# Patient Record
Sex: MALE | Race: WHITE | Employment: STUDENT | URBAN - METROPOLITAN AREA
[De-identification: names, ages, dates, MRNs, and addresses within clinical notes are randomized per-mention and may not be internally consistent; named-entity substitution may affect disease eponyms.]

---

## 2019-04-12 ENCOUNTER — HOSPITAL ENCOUNTER (EMERGENCY)
Age: 29
Discharge: HOME | End: 2019-04-12
Payer: COMMERCIAL

## 2019-04-12 VITALS
RESPIRATION RATE: 18 BRPM | HEIGHT: 70 IN | HEART RATE: 95 BPM | BODY MASS INDEX: 25.77 KG/M2 | WEIGHT: 180 LBS | DIASTOLIC BLOOD PRESSURE: 92 MMHG | OXYGEN SATURATION: 98 % | SYSTOLIC BLOOD PRESSURE: 153 MMHG | TEMPERATURE: 98.8 F

## 2019-04-12 DIAGNOSIS — S61.412A LACERATION OF LEFT PALM, INITIAL ENCOUNTER: Primary | ICD-10-CM

## 2019-04-12 PROCEDURE — 90471 IMMUNIZATION ADMIN: CPT | Performed by: PHYSICIAN ASSISTANT

## 2019-04-12 PROCEDURE — 6360000002 HC RX W HCPCS: Performed by: PHYSICIAN ASSISTANT

## 2019-04-12 PROCEDURE — 99282 EMERGENCY DEPT VISIT SF MDM: CPT

## 2019-04-12 PROCEDURE — 12001 RPR S/N/AX/GEN/TRNK 2.5CM/<: CPT

## 2019-04-12 PROCEDURE — 90715 TDAP VACCINE 7 YRS/> IM: CPT | Performed by: PHYSICIAN ASSISTANT

## 2019-04-12 RX ADMIN — TETANUS TOXOID, REDUCED DIPHTHERIA TOXOID AND ACELLULAR PERTUSSIS VACCINE, ADSORBED 0.5 ML: 5; 2.5; 8; 8; 2.5 SUSPENSION INTRAMUSCULAR at 20:05

## 2019-04-12 ASSESSMENT — PAIN SCALES - GENERAL: PAINLEVEL_OUTOF10: 2

## 2019-04-12 NOTE — ED PROVIDER NOTES
[facility]     Department of Emergency Medicine   ED  Provider Note  Admit Date/RoomTime: 4/12/2019  7:50 PM  ED Room: /   Chief Complaint   Laceration (L hand/thumb, accidently stabbed self with scissors, due for tetanus)    History of Present Illness      Zenobia Young is a 30 y.o. old male presenting to the emergency department for a laceration to the left thenar/palm region caused by scissors. Patient states he was opening a box when the scissors slipped and caught him in the left thenar region. He has no active bleeding at this exam. He denies numbness/tinglgin or sensation changes. He has full ROM of all fingers of affected hand. Patient is alert and oriented x3 and in no apparent distress. He is nontoxic appearing. The patients tetanus status will be updated at today's visit. ROS   Pertinent positives and negatives are stated within HPI, all other systems reviewed and are negative. Past Medical History:  has no past medical history on file. Past Surgical History:  has no past surgical history on file. Social History:      Family History: family history is not on file. Allergies: Patient has no known allergies. Allergies reviewed with patient     Physical Exam   VS:  BP (!) 153/92   Pulse 95   Temp 98.8 °F (37.1 °C) (Oral)   Resp 18   Ht 5' 10\" (1.778 m)   Wt 180 lb (81.6 kg)   SpO2 98%   BMI 25.83 kg/m²      Oxygen Saturation Interpretation: Normal.    Constitutional:  Alert and oriented x4, development consistent with age. HEENT:  NC/NT. Airway patent. PERRLA, EOMI   Neck:  Normal ROM. Supple. Non-tender. Extremities: 0.5cm puncture to left adame thenar region, no active bleeding or surrounding bruising/erythema, full ROM of thumb and all fingers, intact sensations distally   Lymphatics: No lymphangitis or adenopathy noted. Neurological: CN II-XII grossly intact, Motor functions intact.     Lab / Imaging Results   (All laboratory and radiology results have been personally reviewed by myself)  Labs:  No results found for this visit on 04/12/19. Imaging: All Radiology results interpreted by Radiologist unless otherwise noted. No orders to display     ED Course / Medical Decision Making     Medications   Tetanus-Diphth-Acell Pertussis (BOOSTRIX) injection 0.5 mL (0.5 mLs Intramuscular Given 4/12/19 2005)     Consult(s):  None    Procedure(s):  PROCEDURE NOTE      LACERATION REPAIR  Risks, benefits and alternatives (for applicable procedures below) described. Performed By: Rose Perez PA-C. Laceration #: 1. Location: left thenar region, adame aspect   Length: 0.5cm. The wound area was cleansend with shur-clens and draped in a sterile fashion. Local Anesthesia:  not required. The wound was explored with the following results:  no foreign body or tendon injury seen. Flaps Aligned: yes. The wound was closed with Dermabond. Dressing:  a sterile dressing was placed. There were no additional lacerations requiring repair. MDM:     Counseling: The emergency provider has spoken with the patient/caregiver and discussed todays results, in addition to providing specific details for the plan of care and counseling regarding the diagnosis and prognosis. Questions are answered at this time and they are agreeable with the plan. Pt was educated on signs of infection and wound care. Pt was advised to return to ED if signs of infection or fever develop. Pt understands they must follow-up with PCP for suture removal and/or wound check. Pt remained nontoxic, afebrile, and A&O x3 during this ED visit. They agreed with plan of care, discharge, and importance of follow-up. Pt was in no distress at discharge. Vitals stable. All results reviewed with pt and all questions answered. Assessment     1.  Laceration of left palm, initial encounter      Plan   Discharge to home  Patient condition is good    New Medications     There are no discharge medications for this patient. Electronically signed by Zakiya Dobson PA-C   DD: 4/12/19  **This report was transcribed using voice recognition software. Every effort was made to ensure accuracy; however, inadvertent computerized transcription errors may be present.     END OF ED PROVIDER NOTE        Zakiya Dobson PA-C  04/16/19 1037